# Patient Record
Sex: FEMALE | Race: WHITE | NOT HISPANIC OR LATINO | Employment: FULL TIME | URBAN - METROPOLITAN AREA
[De-identification: names, ages, dates, MRNs, and addresses within clinical notes are randomized per-mention and may not be internally consistent; named-entity substitution may affect disease eponyms.]

---

## 2024-11-15 ENCOUNTER — OFFICE VISIT (OUTPATIENT)
Dept: INTERNAL MEDICINE | Facility: CLINIC | Age: 34
End: 2024-11-15
Payer: COMMERCIAL

## 2024-11-15 ENCOUNTER — LAB (OUTPATIENT)
Dept: LAB | Facility: HOSPITAL | Age: 34
End: 2024-11-15
Payer: COMMERCIAL

## 2024-11-15 VITALS
OXYGEN SATURATION: 97 % | DIASTOLIC BLOOD PRESSURE: 80 MMHG | WEIGHT: 218.4 LBS | HEART RATE: 81 BPM | SYSTOLIC BLOOD PRESSURE: 132 MMHG | HEIGHT: 67 IN | BODY MASS INDEX: 34.28 KG/M2

## 2024-11-15 DIAGNOSIS — K58.0 IRRITABLE BOWEL SYNDROME WITH DIARRHEA: ICD-10-CM

## 2024-11-15 DIAGNOSIS — Z13.0 SCREENING FOR ENDOCRINE, NUTRITIONAL, METABOLIC AND IMMUNITY DISORDER: ICD-10-CM

## 2024-11-15 DIAGNOSIS — Z01.419 WELL WOMAN EXAM WITH ROUTINE GYNECOLOGICAL EXAM: ICD-10-CM

## 2024-11-15 DIAGNOSIS — Z76.89 ENCOUNTER TO ESTABLISH CARE WITH NEW DOCTOR: ICD-10-CM

## 2024-11-15 DIAGNOSIS — Z13.29 SCREENING FOR ENDOCRINE, NUTRITIONAL, METABOLIC AND IMMUNITY DISORDER: ICD-10-CM

## 2024-11-15 DIAGNOSIS — Z13.220 SCREENING, LIPID: ICD-10-CM

## 2024-11-15 DIAGNOSIS — Z23 NEED FOR INFLUENZA VACCINATION: ICD-10-CM

## 2024-11-15 DIAGNOSIS — Z13.21 SCREENING FOR ENDOCRINE, NUTRITIONAL, METABOLIC AND IMMUNITY DISORDER: ICD-10-CM

## 2024-11-15 DIAGNOSIS — Z13.228 SCREENING FOR ENDOCRINE, NUTRITIONAL, METABOLIC AND IMMUNITY DISORDER: ICD-10-CM

## 2024-11-15 DIAGNOSIS — Z23 NEED FOR TDAP VACCINATION: ICD-10-CM

## 2024-11-15 DIAGNOSIS — F41.1 GENERALIZED ANXIETY DISORDER: ICD-10-CM

## 2024-11-15 DIAGNOSIS — Z00.00 WELLNESS EXAMINATION: Primary | ICD-10-CM

## 2024-11-15 PROBLEM — E61.1 IRON DEFICIENCY: Status: ACTIVE | Noted: 2024-11-15

## 2024-11-15 LAB
ALBUMIN SERPL-MCNC: 4.2 G/DL (ref 3.5–5.2)
ALBUMIN/GLOB SERPL: 1.5 G/DL
ALP SERPL-CCNC: 113 U/L (ref 39–117)
ALT SERPL W P-5'-P-CCNC: 18 U/L (ref 1–33)
ANION GAP SERPL CALCULATED.3IONS-SCNC: 8 MMOL/L (ref 5–15)
AST SERPL-CCNC: 16 U/L (ref 1–32)
BILIRUB SERPL-MCNC: 0.2 MG/DL (ref 0–1.2)
BUN SERPL-MCNC: 8 MG/DL (ref 6–20)
BUN/CREAT SERPL: 11.6 (ref 7–25)
CALCIUM SPEC-SCNC: 11 MG/DL (ref 8.6–10.5)
CHLORIDE SERPL-SCNC: 106 MMOL/L (ref 98–107)
CHOLEST SERPL-MCNC: 124 MG/DL (ref 0–200)
CO2 SERPL-SCNC: 24 MMOL/L (ref 22–29)
CREAT SERPL-MCNC: 0.69 MG/DL (ref 0.57–1)
DEPRECATED RDW RBC AUTO: 40.8 FL (ref 37–54)
EGFRCR SERPLBLD CKD-EPI 2021: 115.5 ML/MIN/1.73
ERYTHROCYTE [DISTWIDTH] IN BLOOD BY AUTOMATED COUNT: 14.6 % (ref 12.3–15.4)
GLOBULIN UR ELPH-MCNC: 2.8 GM/DL
GLUCOSE SERPL-MCNC: 91 MG/DL (ref 65–99)
HBA1C MFR BLD: 5 % (ref 4.8–5.6)
HCT VFR BLD AUTO: 31.2 % (ref 34–46.6)
HDLC SERPL-MCNC: 32 MG/DL (ref 40–60)
HGB BLD-MCNC: 9.9 G/DL (ref 12–15.9)
LDLC SERPL CALC-MCNC: 70 MG/DL (ref 0–100)
LDLC/HDLC SERPL: 2.1 {RATIO}
MCH RBC QN AUTO: 24.5 PG (ref 26.6–33)
MCHC RBC AUTO-ENTMCNC: 31.7 G/DL (ref 31.5–35.7)
MCV RBC AUTO: 77.2 FL (ref 79–97)
PLATELET # BLD AUTO: 240 10*3/MM3 (ref 140–450)
PMV BLD AUTO: 12.6 FL (ref 6–12)
POTASSIUM SERPL-SCNC: 4.2 MMOL/L (ref 3.5–5.2)
PROT SERPL-MCNC: 7 G/DL (ref 6–8.5)
RBC # BLD AUTO: 4.04 10*6/MM3 (ref 3.77–5.28)
SODIUM SERPL-SCNC: 138 MMOL/L (ref 136–145)
TRIGL SERPL-MCNC: 124 MG/DL (ref 0–150)
TSH SERPL DL<=0.05 MIU/L-ACNC: 0.86 UIU/ML (ref 0.27–4.2)
VLDLC SERPL-MCNC: 22 MG/DL (ref 5–40)
WBC NRBC COR # BLD AUTO: 7.83 10*3/MM3 (ref 3.4–10.8)

## 2024-11-15 PROCEDURE — 83036 HEMOGLOBIN GLYCOSYLATED A1C: CPT

## 2024-11-15 PROCEDURE — 80050 GENERAL HEALTH PANEL: CPT

## 2024-11-15 PROCEDURE — 80061 LIPID PANEL: CPT

## 2024-11-15 NOTE — PROGRESS NOTES
Office Note     Name: Nereida Mejia    : 1990     MRN: [id number]     Chief Complaint  Establish Care (Hasn't been to PCP in years ), Anxiety, and Fatigue    Subjective     History of Present Illness:  Nereida Mejia is a 34 y.o. female who presents today for establish care, anxiety and fatigue   Is currently fasting     Some concerns for stomach issues- diarrhea on and off- will suddenly have urgency for bowel movements happens approx 2-3 times per month   No correlation with foods   Has tried elimination diet- no conclusion   IBS diarrhea type     Anxiety and generalized fatigue    PMH-  IBS diarrhea   Low iron?     Meds-  None     Family Hx-   Maternal GM- - heart failure, cervical cancer   Maternal GF- - alcohol   Paternal GM- alive   Paternal GF- - unsure   Mother- alive- arthritis, depression, HPL, bipolar?   Father- alive- alcohol, drug, DM, HPL, Kidney disease   Brother- alive- anxiety, bipolar, ADHD   Brother- -   Brother- alive- Healthy     Alcohol not currently- quit   Smoking - quit  1 pack a day x 15 years   Drug use none   Job - [employer]   Stress - 6/10   Sun Exposure - will try to protect skin       Dental/Eye exams - up to date. Vision is stable- needs glasses- computer   Diet/Exercise- Diet- Healthy- moderate - homecooked. - does like soda- tries to limit   Exercise- walking daily approx 20min. Was more active previously    OBGYN -will place referral   PAP will schedule  Mammo - not yet - will start at 38  DEXA - not yet   BC/Hormone replacement - none   Vitamin D - none      Colonoscopy/Cologuard - will start at 43.     Immunizations  Tdap-today   Flu- today   COVID- completed no boosters   PNA- not yet       Review of Systems:   Review of Systems   Constitutional:  Negative for chills and fever.   HENT:  Negative for dental problem, trouble swallowing and voice change.    Eyes:  Negative for visual disturbance.   Respiratory:   Negative for cough, chest tightness, shortness of breath and wheezing.    Cardiovascular:  Negative for chest pain, palpitations and leg swelling.   Gastrointestinal:  Positive for diarrhea. Negative for abdominal pain, blood in stool, constipation, nausea and vomiting.        IBS   Genitourinary:  Negative for dysuria.   Musculoskeletal:  Negative for gait problem.   Skin:  Negative for rash.   Neurological:  Negative for light-headedness and headache.   Psychiatric/Behavioral:  Positive for dysphoric mood, depressed mood and stress. Negative for self-injury, sleep disturbance and suicidal ideas. The patient is nervous/anxious.        Past Medical History:   Past Medical History:   Diagnosis Date    Asthma Childhood    No issues now       Past Surgical History:   Past Surgical History:   Procedure Laterality Date     SECTION  2013, 3.18.2009, 2007    I had 3 the dates are 2007, 2009, 2013       Immunizations:   Immunization History   Administered Date(s) Administered    COVID-19 (MODERNA) 1st,2nd,3rd Dose Monovalent 2021, 2021        Medications:   No current outpatient medications on file.    Allergies:   No Known Allergies    Family History:   Family History   Problem Relation Age of Onset    Arthritis Mother     Depression Mother     Hyperlipidemia Mother     Mental illness Mother     Alcohol abuse Father     Diabetes Father     Drug abuse Father     Hyperlipidemia Father     Kidney disease Father     Anxiety disorder Brother     Mental illness Brother     Bipolar disorder Brother     Heart failure Maternal Grandmother     Cervical cancer Maternal Grandmother     Stroke Maternal Grandfather     Alcohol abuse Maternal Grandfather     COPD Paternal Aunt     Learning disabilities Paternal Aunt     Mental illness Paternal Aunt     Learning disabilities Paternal Uncle     Mental illness Paternal Uncle        Social History:   Social History     Socioeconomic History     "Marital status:    Tobacco Use    Smoking status: Former     Current packs/day: 0.00     Average packs/day: 1.5 packs/day for 10.0 years (15.0 ttl pk-yrs)     Types: Cigarettes     Quit date:      Years since quittin.8    Smokeless tobacco: Never    Tobacco comments:     Off and on for 10 years dont remember dates   Vaping Use    Vaping status: Never Used   Substance and Sexual Activity    Alcohol use: Not Currently     Comment: quit     Drug use: Never    Sexual activity: Yes     Partners: Male     Birth control/protection: Tubal ligation         Objective     Vital Signs  /80   Pulse 81   Ht 170.2 cm (67\")   Wt 99.1 kg (218 lb 6.4 oz)   SpO2 97%   BMI 34.21 kg/m²   Estimated body mass index is 34.21 kg/m² as calculated from the following:    Height as of this encounter: 170.2 cm (67\").    Weight as of this encounter: 99.1 kg (218 lb 6.4 oz).    BMI is >= 30 and <35. (Class 1 Obesity). The following options were offered after discussion;: exercise counseling/recommendations and nutrition counseling/recommendations      Physical Exam  Vitals and nursing note reviewed.   Constitutional:       General: She is not in acute distress.     Appearance: Normal appearance.   HENT:      Head: Normocephalic and atraumatic.      Right Ear: Tympanic membrane normal.      Left Ear: Tympanic membrane normal.      Nose: Nose normal.      Mouth/Throat:      Mouth: Mucous membranes are moist.   Eyes:      Extraocular Movements: Extraocular movements intact.      Conjunctiva/sclera: Conjunctivae normal.      Pupils: Pupils are equal, round, and reactive to light.   Cardiovascular:      Rate and Rhythm: Normal rate and regular rhythm.      Heart sounds: Normal heart sounds.   Pulmonary:      Effort: Pulmonary effort is normal. No respiratory distress.      Breath sounds: Normal breath sounds.   Abdominal:      General: Bowel sounds are normal.      Palpations: Abdomen is soft.   Musculoskeletal:         " General: Normal range of motion.      Cervical back: Normal range of motion.      Comments: Moving all extremities    Skin:     General: Skin is warm and dry.   Neurological:      General: No focal deficit present.      Mental Status: She is alert and oriented to person, place, and time.   Psychiatric:         Mood and Affect: Mood normal.         Behavior: Behavior normal.         Thought Content: Thought content normal.         Judgment: Judgment normal.          Procedures     Assessment and Plan   Diagnosis Discussed   Continue to monitor   Plenty of fluids, monitor diet and exercise   Labs ordered will notify of results   Immunizations up to date - Flu and Tdap today   Follow up with GYN- establish care- PAP   Follow up with Gastroenterology   Follow up with Behavioral Health   Take medications as instructed  Follow up as directed   If symptoms worsen or persist please seek further evaluation     1. Wellness examination    2. Encounter to establish care with new doctor  Reviewed PMH, medications and specialists     3. Screening for endocrine, nutritional, metabolic and immunity disorder  - CBC (No Diff); Future  - Comprehensive Metabolic Panel; Future  - Hemoglobin A1c; Future  - TSH Rfx On Abnormal To Free T4; Future    4. Screening, lipid  - Lipid Panel; Future    5. Well woman exam with routine gynecological exam  - Ambulatory Referral to Gynecology  Establish care   Need for PAP- hx of HPV per patient     6. Irritable bowel syndrome with diarrhea  - Ambulatory Referral to Gastroenterology    7. Generalized anxiety disorder  - Ambulatory Referral to Behavioral Health       Follow Up  Return in about 1 year (around 11/15/2025), or if symptoms worsen or fail to improve, for Annual, fasting labs.    Terri Burgos MD  [facility] INTERNAL MEDICINE  [address] [place] [id number] [phone]

## 2024-11-15 NOTE — PATIENT INSTRUCTIONS
Diagnosis Discussed   Continue to monitor   Plenty of fluids, monitor diet and exercise   Labs ordered will notify of results   Immunizations up to date - Flu and Tdap today   Follow up with GYN- establish care- PAP   Follow up with Gastroenterology   Follow up with Behavioral Health   Take medications as instructed  Follow up as directed   If symptoms worsen or persist please seek further evaluation

## 2024-11-21 ENCOUNTER — PATIENT ROUNDING (BHMG ONLY) (OUTPATIENT)
Dept: INTERNAL MEDICINE | Facility: CLINIC | Age: 34
End: 2024-11-21
Payer: COMMERCIAL

## 2024-11-21 DIAGNOSIS — E61.1 IRON DEFICIENCY: Primary | ICD-10-CM

## 2024-11-21 NOTE — PROGRESS NOTES
A My-chart message has been sent to the patient for Patient Rounding with [facility].

## 2024-11-25 RX ORDER — FERROUS GLUCONATE 324(38)MG
324 TABLET ORAL
Qty: 90 TABLET | Refills: 0 | Status: SHIPPED | OUTPATIENT
Start: 2024-11-25

## 2024-11-27 ENCOUNTER — APPOINTMENT (OUTPATIENT)
Dept: GENERAL RADIOLOGY | Facility: HOSPITAL | Age: 34
End: 2024-11-27
Payer: COMMERCIAL

## 2024-11-27 ENCOUNTER — HOSPITAL ENCOUNTER (EMERGENCY)
Facility: HOSPITAL | Age: 34
Discharge: HOME | End: 2024-11-27
Attending: EMERGENCY MEDICINE | Admitting: EMERGENCY MEDICINE
Payer: COMMERCIAL

## 2024-11-27 VITALS
OXYGEN SATURATION: 100 % | HEART RATE: 76 BPM | DIASTOLIC BLOOD PRESSURE: 75 MMHG | RESPIRATION RATE: 16 BRPM | SYSTOLIC BLOOD PRESSURE: 147 MMHG | BODY MASS INDEX: 34.6 KG/M2 | TEMPERATURE: 98.4 F | WEIGHT: 220.46 LBS | HEIGHT: 67 IN

## 2024-11-27 DIAGNOSIS — M70.12 BURSITIS OF LEFT WRIST: ICD-10-CM

## 2024-11-27 DIAGNOSIS — M25.532 LEFT WRIST PAIN: Primary | ICD-10-CM

## 2024-11-27 PROCEDURE — 99283 EMERGENCY DEPT VISIT LOW MDM: CPT

## 2024-11-27 PROCEDURE — 73110 X-RAY EXAM OF WRIST: CPT

## 2024-11-27 NOTE — ED PROVIDER NOTES
EMERGENCY DEPARTMENT ENCOUNTER    Pt Name: Nereida Mejia  MRN: [id number]  Pt :   1990  Room Number:  RW2/R2  Date of encounter:  2024  PCP: Terri Burgos MD  ED Provider: ISABELLA Santiago    Historian: Patient    HPI:  Chief Complaint: Left wrist pain    Context: Nereida Mejia is a 34 y.o. female who presents to the ED c/o left wrist pain. Patient reports experiencing pain in her left wrist, describing it as having two small knots, with swelling and shooting pain extending up to her arm and into the back of her shoulder. The symptoms began on the  and have been intermittent. Recently, she noticed her hand going numb, with her fingertips turning blue or purple, which was visible through her skin. 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 She has a history of carpal tunnel syndrome in her opposite hand, for which she had surgery, and mentions a ganglion cyst that was supposed to be removed during the surgery but has not caused trouble. She denies any fever and notes that she usually feels hot, but recently has felt normal.    HPI     REVIEW OF SYSTEMS  A chief complaint appropriate review of systems was completed and is negative except as noted in the HPI.     PAST MEDICAL HISTORY  Past Medical History:   Diagnosis Date    Asthma Childhood    No issues now       PAST SURGICAL HISTORY  Past Surgical History:   Procedure Laterality Date     SECTION  2013, 3.18.2009, 2007    I had 3 the dates are 2007, 2009, 2013       FAMILY HISTORY  Family History   Problem Relation Age of Onset    Arthritis Mother     Depression Mother     Hyperlipidemia Mother     Mental illness Mother     Alcohol abuse Father     Diabetes Father     Drug abuse Father     Hyperlipidemia Father     Kidney disease Father     Anxiety disorder Brother     Mental illness Brother     Bipolar disorder Brother     Heart  failure Maternal Grandmother     Cervical cancer Maternal Grandmother     Stroke Maternal Grandfather     Alcohol abuse Maternal Grandfather     COPD Paternal Aunt     Learning disabilities Paternal Aunt     Mental illness Paternal Aunt     Learning disabilities Paternal Uncle     Mental illness Paternal Uncle        SOCIAL HISTORY  Social History     Socioeconomic History    Marital status:    Tobacco Use    Smoking status: Former     Current packs/day: 0.00     Average packs/day: 1.5 packs/day for 10.0 years (15.0 ttl pk-yrs)     Types: Cigarettes     Quit date:      Years since quittin.9    Smokeless tobacco: Never    Tobacco comments:     Off and on for 10 years dont remember dates   Vaping Use    Vaping status: Never Used   Substance and Sexual Activity    Alcohol use: Not Currently     Comment: quit     Drug use: Never    Sexual activity: Yes     Partners: Male     Birth control/protection: Tubal ligation       ALLERGIES  Patient has no known allergies.    PHYSICAL EXAM  Physical Exam  Vitals and nursing note reviewed.   Constitutional:       General: She is not in acute distress.     Appearance: Normal appearance. She is not ill-appearing or toxic-appearing.   HENT:      Head: Normocephalic and atraumatic.      Nose: Nose normal.      Mouth/Throat:      Mouth: Mucous membranes are moist.   Eyes:      Extraocular Movements: Extraocular movements intact.   Cardiovascular:      Rate and Rhythm: Normal rate and regular rhythm.      Pulses: Normal pulses.   Pulmonary:      Effort: Pulmonary effort is normal.   Abdominal:      General: There is no distension.   Musculoskeletal:      Right wrist: Normal.      Left wrist: Swelling, deformity, effusion and tenderness present. No bony tenderness, snuff box tenderness or crepitus. Normal range of motion. Normal pulse.      Cervical back: Normal range of motion and neck supple.   Skin:     General: Skin is warm and dry.      Capillary Refill:  Capillary refill takes less than 2 seconds.   Neurological:      General: No focal deficit present.      Mental Status: She is alert.      Sensory: No sensory deficit.   Psychiatric:         Mood and Affect: Mood normal.         Behavior: Behavior normal.       LAB RESULTS    If labs were ordered, I independently reviewed the results and considered them in treating the patient.    RADIOLOGY  XR Wrist 3+ View Left   Final Result   Impression:   No acute osseous abnormality of the left wrist.            Electronically Signed: Virginie Leal MD     11/27/2024 2:29 PM EST     Workstation ID: [id number]        [x] Radiologist's Report Reviewed:  I ordered and independently interpreted the above noted radiographic studies.  See radiologist's dictation for official interpretation.      PROCEDURES    Procedures    No orders to display       MEDICATIONS GIVEN IN ER    Medications - No data to display    MEDICAL DECISION MAKING, PROGRESS, and CONSULTS   Medical Decision Making  34-year-old nontoxic-appearing female presents ER for evaluation of left wrist pain.  No acute or emergent findings on physical exam.  Neurovascularly intact with strong radial pulses and brisk capillary refill.  Sensation intact.  Imaging of left wrist demonstrates no acute abnormalities.  Patient instructed on symptomatic care and outpatient follow-up with primary care and orthopedic hand.  Patient agreeable to plan of care.  Return precautions provided.    Problems Addressed:  Bursitis of left wrist: complicated acute illness or injury  Left wrist pain: complicated acute illness or injury    Amount and/or Complexity of Data Reviewed  Radiology: ordered and independent interpretation performed. Decision-making details documented in ED Course.      Discussion below represents my analysis of pertinent findings related to patient's condition, differential diagnosis, treatment plan and final disposition.    Differential diagnosis: The symptoms described,  including intermittent wrist pain, numbness, and discoloration of the fingertips, suggest possible nerve involvement, potentially related to her history of carpal tunnel syndrome. The presence of a ganglion cyst might also contribute to these symptoms. Differential diagnoses could include recurrent carpal tunnel syndrome, Raynaud's phenomenon or another form of peripheral neuropathy. Additional differential diagnosis include but are not limited to Contusion, arthritis, fracture, dislocation, tendon/ligamentous injury.       Additional sources  Discussed/ obtained information from independent historians:   [] Spouse  [] Parent  [] Family member  [] Friend  [] EMS   [] Other:  External (non-ED) record review:   [] Inpatient record:   [] Office record:   [] Outpatient record:   [] Prior Outpatient labs:   [] Prior Outpatient radiology:   [] Primary Care record:   [] Outside ED record:   [] Other:   Patient's care impacted by:   [] Diabetes  [] Hypertension  [] Hyperlipidemia  [] Hypothyroidism   [] Coronary Artery Disease   [] COPD   [] Cancer   [] Obesity  [] GERD   [] Tobacco Abuse   [] Substance Abuse    [x] Anxiety   [] Depression   [x] Other: Iron deficiency, irritable bowel syndrome with diarrhea  Care significantly affected by Social Determinants of Health (housing and economic circumstances, unemployment)    [] Yes     [x] No   If yes, Patient's care significantly limited by  Social Determinants of Health including:   [] Inadequate housing   [] Low income   [] Alcoholism and drug addiction in family   [] Problems related to primary support group   [] Unemployment   [] Problems related to employment   [] Other Social Determinants of Health:   Orders placed during this visit:  Orders Placed This Encounter   Procedures    XR Wrist 3+ View Left   I considered prescription management  with:   [x] Pain medication: The use of prescription pain medication was considered in this patient however not implemented as risks  outweigh benefits of prescription pain management and it was felt patient's symptoms could be managed with OTC anti-inflammatory medications and Tylenol.   [] Antiviral  [] Antibiotic   [] Other:   Rationale:  ED Course:    ED Course as of 11/27/24 1720   Wed Nov 27, 2024   1446 Vitals and Telemetry tracing was reviewed and directly interpreted by myself demonstrating blood pressure 147/75, afebrile, heart rate 76, respirations 16 breaths/min, oxygen saturation 100% on room air [JG]   1447 BP: 147/75 [JG]   1447 Temp: 98.4 °F (36.9 °C) [JG]   1447 Heart Rate: 76 [JG]   1447 Resp: 16 [JG]   1447 SpO2: 100 % [JG]   1447 X-ray left wrist personally interpreted by myself and with official read provided by radiology demonstrates no acute osseous abnormality of the left wrist. [JG]      ED Course User Index  [JG] Johan Casey, PA          DIAGNOSIS  Final diagnoses:   Left wrist pain   Bursitis of left wrist     DISPOSITION    ED Disposition       ED Disposition   Discharge    Condition   Stable    Comment   --           DISCHARGE    Patient discharged in stable condition.    Reviewed implications of results, diagnosis, meds, responsibility to follow up, warning signs and symptoms of possible worsening, potential complications and reasons to return to ER.    Patient/Family voiced understanding of above instructions.    Discussed plan for discharge, as there is no emergent indication for admission.  Pt/family is agreeable and understands need for follow up and possible repeat testing.  Pt/family is aware that discharge does not mean that nothing is wrong but that it indicates no emergency is currently present that requires admission and they must continue care with follow-up as given below or with a physician of their choice.     FOLLOW-UP  Paula Bailey MD  [address] [address] [phone]    Call   Call for follow up with orthopedic surgery    [facility] EMERGENCY  DEPARTMENT  [address] [facility] [address]  [phone]  Go to   If symptoms worsen         Medication List      No changes were made to your prescriptions during this visit.          Please note that portions of this document were completed with voice recognition software.        Johan Casey PA  11/27/24 [id number]

## 2024-11-27 NOTE — DISCHARGE INSTRUCTIONS
Symptomatic care is recommended. Take all medications as prescribed and instructed. Follow up with orthopedic hand and primary care as directed or return to Emergency Department with worsening of symptoms.

## 2024-12-17 ENCOUNTER — TELEPHONE (OUTPATIENT)
Dept: ORTHOPEDIC SURGERY | Facility: CLINIC | Age: 34
End: 2024-12-17
Payer: COMMERCIAL

## 2024-12-17 NOTE — TELEPHONE ENCOUNTER
"Relay     \"LVM - PLEASE SCHEDULE NEW PT APPOINTMENT WITH DR ABURTO OR DR MUÑOZ TO ESTABLISH CARE\"                "

## 2024-12-23 ENCOUNTER — OFFICE VISIT (OUTPATIENT)
Dept: ORTHOPEDIC SURGERY | Facility: CLINIC | Age: 34
End: 2024-12-23
Payer: COMMERCIAL

## 2024-12-23 VITALS
BODY MASS INDEX: 34.53 KG/M2 | DIASTOLIC BLOOD PRESSURE: 78 MMHG | WEIGHT: 220 LBS | HEIGHT: 67 IN | SYSTOLIC BLOOD PRESSURE: 140 MMHG

## 2024-12-23 DIAGNOSIS — G56.22 CUBITAL TUNNEL SYNDROME ON LEFT: ICD-10-CM

## 2024-12-23 DIAGNOSIS — G56.02 CARPAL TUNNEL SYNDROME OF LEFT WRIST: Primary | ICD-10-CM

## 2024-12-23 NOTE — PROGRESS NOTES
[facility] Orthopedic     Office Visit       Date: 12/23/2024   Patient Name: Nereida Mejia  MRN: [id number]  YOB: 1990    Referring Physician: Hilario Armstrong MD     Chief Complaint:   Chief Complaint   Patient presents with   • Left Hand - Pain, Initial Evaluation   • Left Wrist - Pain, Initial Evaluation     History of Present Illness:   Nereida Mejia is a 34 y.o. female right-hand-dominant presented clinic for new patient with complaints of left hand and wrist pain, swelling, numbness and tingling.  Symptoms have been present since November 19.  She reports that she was typing a lot at work when she developed pain, swelling, and a blue hue to her fingers.  This improved when she stopped typing.  Symptoms have been aggravated with repetitive use of the digits.  She endorses numbness and tingling that is worse in the small and ring fingers but is present throughout the digits.  She has had a prior right carpal tunnel release in 2014 with no recurrence of her symptoms.  She has no family history of rheumatoid arthritis or lupus.  No other complaints or concerns.    Subjective   Review of Systems:   Review of Systems   HENT: Negative.     Eyes: Negative.    Respiratory: Negative.     Cardiovascular: Negative.    Gastrointestinal: Negative.    Endocrine: Negative.    Genitourinary: Negative.    Musculoskeletal:  Positive for arthralgias.   Allergic/Immunologic: Negative.    Hematological: Negative.    Psychiatric/Behavioral: Negative.        Pertinent review of systems per HPI    I reviewed the patient's chief complaint, history of present illness, review of systems, past medical history, surgical history, family history, social history, medications and allergy list in the EMR on 12/23/2024 and agree with the findings above.    Objective    Vital Signs:   Vitals:    12/23/24 1353   BP: 140/78   Weight: 99.8 kg (220 lb)   Height:  "170.2 cm (67\")     General: No acute distress. Alert and oriented.   Cardiovascular: Palpable radial pulse.   Respiratory: Breathing is nonlabored.   Ortho Exam:  Examination left upper extremity demonstrates no deformity.  No skin lesions or abrasions.  No atrophy or wasting.  Positive Tinel, Durkan's, Phalen's at the wrist.  Negative Tinel's over Guyon's canal.  Positive Tinel's at the elbow with positive flexion compression test.  Nontender over the pronator.  There is a small volar ganglion cyst measuring less than 1 x 1 cm.  This is nontender to palpation.  Palpable radial and ulnar pulses.  Juan's test demonstrates appropriate flow through both the radial and ulnar arteries.  No skin ulcerations.  Sensation is diminished to light touch throughout the median and ulnar nerve distributions.  Imaging / Studies:    Imaging Results (Last 24 Hours)       ** No results found for the last 24 hours. **        Left wrist x-rays obtained on 11/27/2024 were personally reviewed and interpreted by myself. No evidence of acute fracture or dislocation.  Normal alignment.  Normal soft tissue window.    Assessment / Plan    Assessment/Plan:   Nereida Mejia is a 34 y.o. female with lateral carpal and cubital tunnel syndrome.    I discussed with the patient their clinical findings demonstrate carpal tunnel and cubital tunnel syndrome.  The pathophysiology of the condition, including compression of the median nerve in the carpal tunnel and ulnar nerve at the cubital tunnel, was explained in detail.  It was also discussed that with more severe symptomatology, such as persistent and worsening paresthesias, that permanent nerve damage may result, which may make improvement after surgery less predictable.  Both conservative and surgical options were discussed.  Conservative treatments in the form of: observation, gentle nerve gliding exercises, night time splinting, and injection were presented.  Operative treatment in the form of " open carpal tunnel release and cubital tunnel release was presented and reiterated that the goal of surgery is to prevent further compression and damage to the nerve.  Further workup with electrodiagnostic studies was also discussed. After expressing understanding of all options, the patient elects to proceed with nighttime wrist and elbow splinting, nerve glides, and obtaining electrodiagnostic studies.  The patient has been taken off work by the prior treating physician.  I discussed that from my standpoint, she may return to work without restrictions.  Work restrictions will not be provided unless the patient is treated surgically.  She expressed understanding.  They were agreeable with the plan.  All questions and concerns were addressed.      ICD-10-CM ICD-9-CM   1. Carpal tunnel syndrome of left wrist  G56.02 354.0   2. Cubital tunnel syndrome on left  G56.22 354.2     Follow Up:   Return for Follow Up- After Testing.      Paula Bailey MD  [facility]

## 2025-01-15 ENCOUNTER — LAB (OUTPATIENT)
Dept: LAB | Facility: HOSPITAL | Age: 35
End: 2025-01-15
Payer: COMMERCIAL

## 2025-01-15 ENCOUNTER — OFFICE VISIT (OUTPATIENT)
Dept: GASTROENTEROLOGY | Facility: CLINIC | Age: 35
End: 2025-01-15
Payer: COMMERCIAL

## 2025-01-15 VITALS
TEMPERATURE: 97.3 F | BODY MASS INDEX: 34.78 KG/M2 | DIASTOLIC BLOOD PRESSURE: 82 MMHG | WEIGHT: 221.6 LBS | SYSTOLIC BLOOD PRESSURE: 129 MMHG | HEIGHT: 67 IN | HEART RATE: 73 BPM

## 2025-01-15 DIAGNOSIS — R19.7 DIARRHEA, UNSPECIFIED TYPE: Primary | ICD-10-CM

## 2025-01-15 DIAGNOSIS — R19.7 DIARRHEA, UNSPECIFIED TYPE: ICD-10-CM

## 2025-01-15 LAB

## 2025-01-15 PROCEDURE — 82784 ASSAY IGA/IGD/IGG/IGM EACH: CPT

## 2025-01-15 PROCEDURE — 36415 COLL VENOUS BLD VENIPUNCTURE: CPT

## 2025-01-15 PROCEDURE — 86231 EMA EACH IG CLASS: CPT

## 2025-01-15 PROCEDURE — 86258 DGP ANTIBODY EACH IG CLASS: CPT

## 2025-01-15 PROCEDURE — 87177 OVA AND PARASITES SMEARS: CPT

## 2025-01-15 PROCEDURE — 83993 ASSAY FOR CALPROTECTIN FECAL: CPT

## 2025-01-15 PROCEDURE — 87507 IADNA-DNA/RNA PROBE TQ 12-25: CPT

## 2025-01-15 PROCEDURE — 99204 OFFICE O/P NEW MOD 45 MIN: CPT

## 2025-01-15 PROCEDURE — 86364 TISS TRNSGLTMNASE EA IG CLAS: CPT

## 2025-01-15 PROCEDURE — 82653 EL-1 FECAL QUANTITATIVE: CPT

## 2025-01-15 PROCEDURE — 87209 SMEAR COMPLEX STAIN: CPT

## 2025-01-15 RX ORDER — DICYCLOMINE HYDROCHLORIDE 10 MG/1
CAPSULE ORAL
Qty: 90 CAPSULE | Refills: 1 | Status: SHIPPED | OUTPATIENT
Start: 2025-01-15

## 2025-01-15 NOTE — PROGRESS NOTES
Office Note     Name: Nereida Mejia    : 1990     MRN: [id number]     Chief Complaint  Diarrhea    Subjective     History of Present Illness:  History of Present Illness  The patient is a 34-year-old female who presents today for evaluation of diarrhea for the past 5 years.    She reports experiencing severe abdominal cramps followed by episodes of explosive diarrhea. She has attempted to identify potential dietary triggers through an elimination diet over the past 3 months, but this has not yielded any significant changes.     She has sought emergency care on several occasions due to these symptoms, with the most recent visit occurring in 2024. During this visit, she was diagnosed with a stomach bug and was prescribed antiemetic medication and intravenous fluids. She reports no current bleeding associated with her diarrhea, although she did observe streaks of blood and mucus during her ER visit. She also reports frequent mucus production towards the end of her bowel movements and has developed hemorrhoids.     She occasionally experiences vomiting and severe cramping, which occurred most recently 2 days ago. She typically has 3 to 4 bowel movements on a good day. She has not experienced constipation or urgency after meals. She has previously experienced dark green stools but reports no tarry stools. She reports no current abdominal pain. She has a history of severe heartburn, for which she took Zantac daily, but currently reports no heartburn for quite a while now.    She has a lifelong history of anemia, the cause of which is unknown. She does take an iron supplement.     Supplemental Information  She has retained her gallbladder. Her surgical history includes a  and tubal ligation.    SOCIAL HISTORY  She quit smoking. She does not drink alcohol.    FAMILY HISTORY  Her mother has stomach ulcers and had her gallbladder removed. Her grandmother had cervical cancer, skin cancer, and a weak  heart, which she eventually  from. She does not have any family history of colon cancer that she is aware of. Her mother and daughter are anemic.    MEDICATIONS  Past: Zantac    Past Medical History:   Past Medical History:   Diagnosis Date    Anemia     Asthma Childhood    No issues now    CTS (carpal tunnel syndrome) 2014    Right hand surgery       Past Surgical History:   Past Surgical History:   Procedure Laterality Date     SECTION  2013, 3.18.2009, 2007    I had 3 the dates are 2007, 2009, 2013    HAND SURGERY  2014    Right hand carpal tunnel    TUBAL ABDOMINAL LIGATION         Immunizations:   Immunization History   Administered Date(s) Administered    COVID-19 (MODERNA) 1st,2nd,3rd Dose Monovalent 2021, 2021    Fluzone  >6mos 11/15/2024    Tdap 11/15/2024        Medications:     Current Outpatient Medications:     ferrous gluconate (FERGON) 324 MG tablet, Take 1 tablet by mouth Daily With Breakfast., Disp: 90 tablet, Rfl: 0    Allergies:   No Known Allergies    Family History:   Family History   Problem Relation Age of Onset    Arthritis Mother     Depression Mother     Hyperlipidemia Mother     Mental illness Mother     Alcohol abuse Father     Diabetes Father     Drug abuse Father     Hyperlipidemia Father     Kidney disease Father     Anxiety disorder Brother     Mental illness Brother     Bipolar disorder Brother     COPD Paternal Aunt     Learning disabilities Paternal Aunt     Mental illness Paternal Aunt     Learning disabilities Paternal Uncle     Mental illness Paternal Uncle     Heart failure Maternal Grandmother     Cervical cancer Maternal Grandmother     Stroke Maternal Grandfather     Alcohol abuse Maternal Grandfather     Colon cancer Neg Hx        Social History:   Social History     Socioeconomic History    Marital status:    Tobacco Use    Smoking status: Former     Current packs/day: 0.00     Average packs/day: 1.5 packs/day for  "10.0 years (15.0 ttl pk-yrs)     Types: Cigarettes     Quit date: 2010     Years since quitting: 15.0     Passive exposure: Past    Smokeless tobacco: Never    Tobacco comments:     Off and on for 10 years dont remember dates   Vaping Use    Vaping status: Never Used   Substance and Sexual Activity    Alcohol use: Never     Comment: quit 2007    Drug use: Never    Sexual activity: Yes     Partners: Male     Birth control/protection: Tubal ligation         Objective     Vital Signs  /82 (BP Location: Left arm, Patient Position: Sitting, Cuff Size: Adult)   Pulse 73   Temp 97.3 °F (36.3 °C) (Temporal)   Ht 170.2 cm (67\")   Wt 101 kg (221 lb 9.6 oz)   BMI 34.71 kg/m²   Estimated body mass index is 34.71 kg/m² as calculated from the following:    Height as of this encounter: 170.2 cm (67\").    Weight as of this encounter: 101 kg (221 lb 9.6 oz).            Physical Exam  Vitals reviewed.   Constitutional:       General: She is awake.   Cardiovascular:      Rate and Rhythm: Normal rate.   Pulmonary:      Effort: Pulmonary effort is normal.   Abdominal:      Palpations: Abdomen is soft.      Tenderness: There is no abdominal tenderness.   Skin:     General: Skin is warm and dry.   Neurological:      Mental Status: She is alert.        Physical Exam      Results        Assessment and Plan     Assessment & Plan  Diarrhea, unspecified type    Orders:    Calprotectin, Fecal - Stool, Per Rectum; Future    Pancreatic Elastase, Fecal - Stool, Per Rectum; Future    Ova & Parasite Examination - Stool, Per Rectum    Celiac Comprehensive Panel    dicyclomine (BENTYL) 10 MG capsule; Take one by mouth 3 times daily 30 minutes before meals    Gastrointestinal Panel, PCR - Stool, Per Rectum; Future       Assessment & Plan  1. Chronic diarrhea.  The chronic nature of her symptoms, persisting for 5 years, suggests a non-bacterial etiology. Given her anemia and history of rectal bleeding, a colonoscopy is warranted. A fecal " calprotectin test will be conducted to assess for potential inflammatory bowel diseases such as Crohn's disease or ulcerative colitis. A celiac panel will also be ordered. Dicyclomine 10 mg has been prescribed to manage her cramping, to be taken as needed up to a few times daily. FiberCon has been recommended to add bulk to her stool and potentially alleviate her diarrhea. A colonoscopy will be scheduled, and she has been informed about the procedure and the need for a clear liquid diet the day prior. She has been advised to avoid driving post-procedure due to the sedative effects of the medications used during the procedure.    2. Anemia.  She has a history of anemia since childhood, with no known cause. The anemia has worsened since May. A colonoscopy will be scheduled to investigate potential gastrointestinal causes. She has been advised to monitor for any signs of black or tarry stools and to report any such findings immediately.       Follow Up  After colonoscopy    Patient or patient representative verbalized consent for the use of Ambient Listening during the visit with  JESS De La O for chart documentation. 1/15/2025  08:43 JESS White  [facility]  [facility] GASTROENTEROLOGY  [address] [place] [id number]

## 2025-01-15 NOTE — PATIENT INSTRUCTIONS
Fibercon is a great fiber supplement that doesn't cause bloating. Each dose is only 2-3 grams of fiber.

## 2025-01-16 LAB
ENDOMYSIUM IGA SER QL: NEGATIVE
GLIADIN PEPTIDE IGA SER-ACNC: 3 UNITS (ref 0–19)
GLIADIN PEPTIDE IGG SER-ACNC: 3 UNITS (ref 0–19)
IGA SERPL-MCNC: 183 MG/DL (ref 87–352)
TTG IGA SER-ACNC: <2 U/ML (ref 0–3)
TTG IGG SER-ACNC: 3 U/ML (ref 0–5)

## 2025-01-17 LAB
O+P SPEC MICRO: NORMAL
O+P STL TRI STN: NORMAL

## 2025-01-17 RX ORDER — SODIUM, POTASSIUM,MAG SULFATES 17.5-3.13G
SOLUTION, RECONSTITUTED, ORAL ORAL
Qty: 354 ML | Refills: 0 | Status: SHIPPED | OUTPATIENT
Start: 2025-01-17 | End: 2025-01-17 | Stop reason: SDUPTHER

## 2025-01-19 LAB — CALPROTECTIN STL-MCNT: 16 UG/G (ref 0–120)

## 2025-01-20 LAB — ELASTASE PANC STL-MCNT: >800 UG ELAST./G

## 2025-01-22 RX ORDER — SODIUM, POTASSIUM,MAG SULFATES 17.5-3.13G
SOLUTION, RECONSTITUTED, ORAL ORAL
Qty: 354 ML | Refills: 0 | Status: SHIPPED | OUTPATIENT
Start: 2025-01-22

## 2025-01-23 ENCOUNTER — OUTSIDE FACILITY SERVICE (OUTPATIENT)
Dept: GASTROENTEROLOGY | Facility: CLINIC | Age: 35
End: 2025-01-23
Payer: COMMERCIAL

## 2025-01-23 PROCEDURE — 45385 COLONOSCOPY W/LESION REMOVAL: CPT | Performed by: INTERNAL MEDICINE

## 2025-01-23 PROCEDURE — 88305 TISSUE EXAM BY PATHOLOGIST: CPT | Performed by: INTERNAL MEDICINE

## 2025-01-23 PROCEDURE — 45380 COLONOSCOPY AND BIOPSY: CPT | Performed by: INTERNAL MEDICINE

## 2025-01-24 ENCOUNTER — LAB REQUISITION (OUTPATIENT)
Dept: LAB | Facility: HOSPITAL | Age: 35
End: 2025-01-24
Payer: COMMERCIAL

## 2025-01-24 DIAGNOSIS — D12.3 BENIGN NEOPLASM OF TRANSVERSE COLON: ICD-10-CM

## 2025-01-24 DIAGNOSIS — R19.7 DIARRHEA, UNSPECIFIED: ICD-10-CM

## 2025-01-27 LAB
CYTO UR: NORMAL
LAB AP CASE REPORT: NORMAL
LAB AP CLINICAL INFORMATION: NORMAL
PATH REPORT.FINAL DX SPEC: NORMAL
PATH REPORT.GROSS SPEC: NORMAL

## 2025-02-18 ENCOUNTER — PROCEDURE VISIT (OUTPATIENT)
Dept: NEUROLOGY | Facility: CLINIC | Age: 35
End: 2025-02-18
Payer: COMMERCIAL

## 2025-02-18 DIAGNOSIS — G56.22 CUBITAL TUNNEL SYNDROME ON LEFT: ICD-10-CM

## 2025-02-18 DIAGNOSIS — G56.02 CARPAL TUNNEL SYNDROME OF LEFT WRIST: ICD-10-CM

## 2025-02-18 NOTE — PROGRESS NOTES
[facility] Neurology [place]   Electrodiagnostic Laboratory    Nerve Conduction & EMG Report        Patient:   Nereida Mejia   Patient ID: [id number]   YOB: 1990  Sex: female      Exam Physician:  Cabrera Lozada MD  Refer Physician:  Paula Bailey MD    Electromyogram and Nerve Conduction Velocity Procedure Note    Hx: 34 y.o. right handed female with complaint of numbness involving the the upper extremities. Symptoms have been present for several months and were provoked by activity. Significant past medical history includes carpal tunnel syndrome.  Family history no family history of nerve or muscle disease.    Exam: Motor power is normal. There is no atrophy. There are no fasciculations. Deep tendon reflexes are present and symmetrical. Sensory exam is normal.      Edx studies of the B UE were performed to evaluate for peripheral nerve entrapment.     NCS Examination   For sensory nerve conduction studies, the amplitude is measured peak-to-peak, the latency reported is the distal peak latency, and the conduction velocity, if measured, is determined from onset latencies and is over the forearm.   For motor nerve conduction studies, the amplitude is measured baseline-to-peak, the latency reported is the distal onset latency, the conduction velocity is calculated over the forearm, and the F wave latency is the minimum latency.   Unless otherwise noted, the hand temperature was monitored continuously and remained between 32°C and 36°C during the performance of the NCSs.          Nerve Conduction Studies  Anti Sensory Summary Table     Stim Site NR Norm Peak (ms) O-P Amp (µV) Norm O-P Amp Onset (ms) Site1 Site2 Delta-0 (ms) Dist (cm) Arturo (m/s) Norm Arturo (m/s)   Left Median Anti Sensory (2nd Digit)   Wrist    <3.6 14.6 >10 3.4 Wrist 2nd Digit 3.4 14.0 41    Right Median Anti Sensory (2nd Digit)   Wrist    <3.6 25.4 >10 3.2 Wrist 2nd Digit 3.2 14.0 44    Left Radial Anti Sensory (Base 1st Digit)    Wrist    <3.1 20.1  0.9 Wrist Base 1st Digit 0.9 0.0     Right Radial Anti Sensory (Base 1st Digit)   Wrist    <3.1 12.9  1.4 Wrist Base 1st Digit 1.4 0.0     Left Ulnar Anti Sensory (5th Digit)   Wrist    <3.7 19.1 >15.0 2.7 Wrist 5th Digit 2.7 0.0     Right Ulnar Anti Sensory (5th Digit)   Wrist    <3.7 24.4 >15.0 2.3 Wrist 5th Digit 2.3 0.0       Motor Summary Table     Stim Site NR Onset (ms) Norm Onset (ms) O-P Amp (mV) Norm O-P Amp Site1 Site2 Delta-0 (ms) Dist (cm) Arturo (m/s) Norm Arturo (m/s)   Left Median Motor (Abd Poll Brev)   Wrist    *4.8 <4.2 9.2 >5 Elbow Wrist 4.2 25.0 60 >50   Elbow    9.0  2.0          Right Median Motor (Abd Poll Brev)   Wrist    4.2 <4.2 6.8 >5 Elbow Wrist 4.9 25.0 51 >50   Elbow    9.1  4.5          Left Ulnar Motor (Abd Dig Minimi)   Wrist    2.9 <4.2 9.1 >3 B Elbow Wrist 4.5 23.0 51 >50   B Elbow    7.4  8.7  A Elbow B Elbow 1.7 10.0 59 >50   A Elbow    9.1  9.1          Right Ulnar Motor (Abd Dig Minimi)   Wrist    3.1 <4.2 9.7 >3 B Elbow Wrist 4.3 23.0 53 >50   B Elbow    7.4  4.7  A Elbow B Elbow 1.4 9.0 64 >50   A Elbow    8.8  9.4            F Wave Studies     NR F-Lat (ms) Lat Norm (ms) L-R F-Lat (ms) L-R Lat Norm   Left Median (Mrkrs) (Abd Poll Brev)      *33.13 <33 0.00 <2.2   Right Median (Mrkrs) (Abd Poll Brev)      *33.13 <33 0.00 <2.2   Right Ulnar (Mrkrs) (Abd Dig Min)      30.83 <36  <2.5     EMG Examination   The study was performed with a concentric needle electrode. Fibrillation and fasciculation activity is graded from none (0) to continuous (4+). The configuration and recruitment pattern of motor unit action potentials under voluntary control, if not normal, are described below       Side Muscle Nerve Root Ins Act Fibs Psw Amp Dur Poly Recrt Int Pat Comment   Left Deltoid Axillary C5-6 Nml Nml Nml Nml Nml 0 Nml Nml    Left Triceps Radial C6-7-8 Nml Nml Nml Nml Nml 0 Nml Nml    Left Biceps Musculocut C5-6 Nml Nml Nml Nml Nml 0 Nml Nml    Left PronatorTeres  Median C6-7 Nml Nml Nml Nml Nml 0 Nml Nml    Left FlexCarpiUln Ulnar C8,T1 Nml Nml Nml Nml Nml 0 Nml Nml    Left 1stDorInt Ulnar C8-T1 Nml Nml Nml Nml Nml 0 Nml Nml    Left Abd Poll Brev Median C8-T1 Nml Nml Nml Nml Nml 0 Nml Nml    Right Deltoid Axillary C5-6 Nml Nml Nml Nml Nml 0 Nml Nml    Right Triceps Radial C6-7-8 Nml Nml Nml Nml Nml 0 Nml Nml    Right Biceps Musculocut C5-6 Nml Nml Nml Nml Nml 0 Nml Nml    Right PronatorTeres Median C6-7 Nml Nml Nml Nml Nml 0 Nml Nml    Right 1stDorInt Ulnar C8-T1 Nml Nml Nml Nml Nml 0 Nml Nml        NCV FINDINGS:  Evaluation of the left median motor nerve showed prolonged distal onset latency (4.8 ms).  The left median sensory, the right median sensory, and the left ulnar sensory nerves showed prolonged distal peak latency (L4.8, R4.1, L3.8 ms).  All remaining nerves (as indicated in the following tables) were within normal limits.  F Wave studies indicate that the left median F wave has prolonged latency (33.13 ms).  The right median F wave has prolonged latency (33.13 ms).  All remaining F Wave latencies were within normal limits.        EMG FINDINGS:  All examined muscles (as indicated in the following table) showed no evidence of electrical instability.     Conclusion: This study showed neurophysiologic evidence of several abnormalities:    Moderate left median neuropathy at the wrist, ie carpal tunnel syndrome.  Mild right median sensory neuropathy at the wrist, ie carpal tunnel syndrome.   Mild left ulnar sensory neuropathy at the wrist.           Instrument used:  Teca Synergy        Performed by:          Cabrera Lozada MD

## 2025-02-24 ENCOUNTER — OFFICE VISIT (OUTPATIENT)
Dept: ORTHOPEDIC SURGERY | Facility: CLINIC | Age: 35
End: 2025-02-24
Payer: COMMERCIAL

## 2025-02-24 VITALS
BODY MASS INDEX: 34.95 KG/M2 | DIASTOLIC BLOOD PRESSURE: 84 MMHG | SYSTOLIC BLOOD PRESSURE: 120 MMHG | HEIGHT: 67 IN | WEIGHT: 222.66 LBS

## 2025-02-24 DIAGNOSIS — G56.22 CUBITAL TUNNEL SYNDROME ON LEFT: ICD-10-CM

## 2025-02-24 DIAGNOSIS — G56.02 CARPAL TUNNEL SYNDROME OF LEFT WRIST: Primary | ICD-10-CM

## 2025-02-24 PROCEDURE — 99214 OFFICE O/P EST MOD 30 MIN: CPT | Performed by: STUDENT IN AN ORGANIZED HEALTH CARE EDUCATION/TRAINING PROGRAM

## 2025-02-24 NOTE — PROGRESS NOTES
"                                                                 [facility] Orthopedic     Office Visit       Date: 02/24/2025   Patient Name: Nereida Mejia  MRN: [id number]  YOB: 1990    Referring Physician: No ref. provider found     Chief Complaint:   Chief Complaint   Patient presents with    Left Wrist - Follow-up     After EMG 02/18/2025     History of Present Illness:   Nereida Mejia is a 34 y.o. female right-hand-dominant presented to clinic for follow-up of EMG results.  She continues to endorse numbness and tingling throughout the left hand that is worse at the thumb index and long fingers but is also present intermittently in the small and ring fingers.  She has been wearing her braces at night without significant improvement.  No other complaints or concerns.    Right carpal tunnel release in 2014.    Subjective   Review of Systems:   Review of Systems   Pertinent review of systems per HPI    I reviewed the patient's chief complaint, history of present illness, review of systems, past medical history, surgical history, family history, social history, medications and allergy list in the EMR on 02/24/2025 and agree with the findings above.    Objective    Vital Signs:   Vitals:    02/24/25 1118   BP: 120/84   Weight: 101 kg (222 lb 10.6 oz)   Height: 170.2 cm (67.01\")     General: No acute distress. Alert and oriented.   Cardiovascular: Palpable radial pulse.   Respiratory: Breathing is nonlabored.   Ortho Exam:  Examination of the left Upper Extremity:  No skin lesions or ecchymosis. No edema.    Interosseous wasting is not visualized   Thenar atrophy is not visualized     Hypothenar atrophy is not visualized     positive Tinel's at the carpal tunnel, positive Durkan's compression test, positive Phalen's maneuver   positive Tinel's over Guyon's canal   positive Tinel's at the ulnar nerve at the elbow   positive Elbow Flexion Test   negative Subluxation of the ulnar nerve   negative " tenderness with deep palpation of the pronator   negative Tinel's with median nerve at pronator   negative Froment's sign, positive Wartenberg's sign   5/5 APB strength   5/5 FDI strength   Sensation is decreased to light touch over the median and ulnar nerve distributions.   Digits warm and well-perfused      Imaging / Studies:    Imaging Results (Last 24 Hours)       ** No results found for the last 24 hours. **        EMG nerve conduction study performed on 2/18/2025 demonstrates:  Moderate left median neuropathy at the wrist, ie carpal tunnel syndrome.  Mild right median sensory neuropathy at the wrist, ie carpal tunnel syndrome.   Mild left ulnar sensory neuropathy at the wrist.     Assessment / Plan    Assessment/Plan:   Nereida Mejia is a 34 y.o. 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    The patient her EMG findings demonstrate moderate left carpal tunnel syndrome and left ulnar nerve entrapment at the wrist.  Clinically the patient does have symptoms of ulnar nerve irritation at the Amarilys canal but also at the cubital tunnel.  This was not picked up on her EMG findings but are clinically significant.  I discussed with her options moving forward including nonoperative treatment and operative treatment.  The patient is most interested in definitive surgical treatment.  I discussed with her doing a staged procedure including the carpal tunnel first followed by ulnar nerve decompression at the elbow and wrist if not improving.  Patient expressed understanding but wishes for 1 surgery.  I think this is a reasonable plan.  I explained the patient the risk, benefits, alternatives and prognosis, and she elects to proceed with left carpal tunnel release and left ulnar nerve decompression at the wrist and elbow.    The risks and benefits of the procedure were discussed with the patient and/or appropriate guardian, which include but are not limited  to: the risk of bleeding, pain, infection, wound complications, neurovascular damage, post-operative stiffness, tendon and/or ligament injury, persistent pain, need for additional surgeries in the future, and general risks from anesthesia. Carpal tunnel risks: Specific risks include: persistent numbness and tingling as the goal of surgery is to prevent further worsening of symptoms, damage to the median nerve and its branches, persistent weakness and pillar pain.,  Ulnar nerve decompression specific risks include: persistent numbness and tingling as the goal of surgery is to prevent further worsening of symptoms, numbness to the incision and/or medial elbow, damage to the medial antebranchial nerve branches, persistent weakness and incisional dehiscence post operatively. We also discussed the post-operative rehabilitation, length of immobilization, the need for therapy, and the overall expected outcomes from the procedure. Proper time was given to answer the patient's questions regarding the procedure. The patient expressed understanding. Knowing what the risks are and what the conservative treatment is, the patient elected to forgo any further conservative treatment options and proceed with the surgical intervention. Surgical consent was obtained in the clinic and signed by myself and the patient. They will follow up with me postoperatively.       ICD-10-CM ICD-9-CM   1. Carpal tunnel syndrome of left wrist  G56.02 354.0   2. Cubital tunnel syndrome on left  G56.22 354.2     Follow Up:   Return for Follow Up- After surgery.      Paula Bailey MD  [facility] Orthopedic & Hand Surgeon

## 2025-02-26 ENCOUNTER — TELEPHONE (OUTPATIENT)
Dept: ORTHOPEDIC SURGERY | Facility: CLINIC | Age: 35
End: 2025-02-26

## 2025-02-26 NOTE — TELEPHONE ENCOUNTER
Caller: SENG    Relationship: SELF    Best call back number: [phone]    What form or medical record are you requesting: NEEDS A WORK NOTE JUST TO SAY SHE WAS SEEN AND NO RESTRICTIONS- [facility]     unknown

## 2025-03-25 ENCOUNTER — TELEPHONE (OUTPATIENT)
Age: 35
End: 2025-03-25
Payer: COMMERCIAL

## 2025-03-25 NOTE — TELEPHONE ENCOUNTER
I called to update patient that I did get in touch with the workers comp  today, She has submitted her paperwork to utilization review and told me to call her back Friday and she should have an answer at that time. She did apologize for the delay. If patient has any questions she can give me a call back, otherwise I will call her Friday sometime after I hear from . Vivi Anne CMA

## 2025-03-28 ENCOUNTER — TELEMEDICINE (OUTPATIENT)
Dept: INTERNAL MEDICINE | Facility: CLINIC | Age: 35
End: 2025-03-28
Payer: COMMERCIAL

## 2025-03-28 DIAGNOSIS — F32.0 CURRENT MILD EPISODE OF MAJOR DEPRESSIVE DISORDER WITHOUT PRIOR EPISODE: Primary | ICD-10-CM

## 2025-03-28 DIAGNOSIS — F41.1 GENERALIZED ANXIETY DISORDER: ICD-10-CM

## 2025-03-28 PROCEDURE — 99213 OFFICE O/P EST LOW 20 MIN: CPT

## 2025-03-28 RX ORDER — BUSPIRONE HYDROCHLORIDE 5 MG/1
5 TABLET ORAL 2 TIMES DAILY
Qty: 60 TABLET | Refills: 0 | Status: SHIPPED | OUTPATIENT
Start: 2025-03-28

## 2025-03-28 NOTE — PROGRESS NOTES
Telehealth E-Visit      Date: 2025   Patient Name: Nereida Mejia  : 1990   MRN: [id number]     Chief Complaint:    Chief Complaint   Patient presents with    Stress    Irritable       I have reviewed the E-Visit questionnaire and the patient's answers, my assessment and plan are listed below.     This provider is located at the [facility] (through [facility]), [address] [place] [address] using a secure [employer] Soft Video Visit through [employer]. Patient is being seen remotely via telehealth at their home address in [place], and stated they are in a secure environment for this session. The patient's condition being diagnosed/treated is appropriate for telemedicine. The provider identified herself as well as her credentials. The patient, and/or patients guardian, consent to be seen remotely, and when consent is given they understand that the consent allows for patient identifiable information to be sent to a third party as needed. They may refuse to be seen remotely at any time. The electronic data is encrypted and password protected, and the patient and/or guardian has been advised of the potential risks to privacy not withstanding such measures.    You have chosen to receive care through a telehealth visit. Do you consent to use a video/audio connection for your medical care today? Yes    History of Present Illness: Nereida Mejia is a 34 y.o. female who is here today to follow up with anxiety and depression- emotional, irritable, stressed.  is on third shift, working most weekends. Feeling overwhelmed with . No close family for support. Feeling exhausted. Daughter is a senior but feels like she is not as excited as she should be. Also waiting to hear back on workman's comp for wrist surgery which is contributing to stress. No SI/HI.       Subjective      Review of Systems:   Review of Systems   Psychiatric/Behavioral:  Positive for sleep  disturbance, depressed mood and stress. Negative for suicidal ideas. The patient is nervous/anxious.        I have reviewed and the following portions of the patient's history were updated as appropriate: past family history, past medical history, past social history, past surgical history and problem list.    Medications:     Current Outpatient Medications:     dicyclomine (BENTYL) 10 MG capsule, Take one by mouth 3 times daily 30 minutes before meals, Disp: 90 capsule, Rfl: 1    ferrous gluconate (FERGON) 324 MG tablet, Take 1 tablet by mouth Daily With Breakfast., Disp: 90 tablet, Rfl: 0    Allergies:   No Known Allergies    Objective     Physical Exam:  Vital Signs: There were no vitals filed for this visit.  There is no height or weight on file to calculate BMI.    Physical Exam  Constitutional:       Comments: Limited due to telehealth           Assessment / Plan      Assessment/Plan:      Current mild episode of major depressive disorder without prior episode  - Due to side effect profile we decided to trial fluoxetine. Patient to follow up with Dr. Burgos in 1-month for evaluation  - PRN buspar  - Declined behavioral health referral at this time    Orders:    FLUoxetine (PROzac) 20 MG capsule; Take 1 capsule by mouth Daily.    Generalized anxiety disorder  - See above  Orders:    busPIRone (BUSPAR) 5 MG tablet; Take 1 tablet by mouth 2 (Two) Times a Day.        Follow Up:   Return in about 1 month (around 4/28/2025) for Recheck mood with Dr. Burgos.    Any medications prescribed have been sent electronically to   [facility] [id number] - [place], [place] - [address] [phone] PH - [phone] FX  [address] [address]  Phone: [phone] Fax: [phone]    [facility]/pharmacy #[id number] - [place], [place] - [address] [facility] [place] - [phone] PH - [phone] FX  [address] [address]  Phone: [phone] Fax: [phone]      24 minutes were spent  reviewing the patient's questionnaire, formulating a treatment plan, and relaying information to the patient via [employer].    TYLER De La Cruz  Internal Medicine [place]   03/28/25  14:14 EDT

## 2025-03-28 NOTE — ASSESSMENT & PLAN NOTE
- See above  Orders:    busPIRone (BUSPAR) 5 MG tablet; Take 1 tablet by mouth 2 (Two) Times a Day.

## 2025-04-07 ENCOUNTER — TELEPHONE (OUTPATIENT)
Dept: ORTHOPEDIC SURGERY | Facility: CLINIC | Age: 35
End: 2025-04-07
Payer: COMMERCIAL

## 2025-04-07 NOTE — TELEPHONE ENCOUNTER
Caller: AURELIA   Relationship to Patient: SELF  Phone Number: [phone]  What form or medical record are you requesting: WORK NOTE STATING THAT SHE WILL BE OFF AFTER SX UNTIL 1ST POST OP APPT     STATES SHE NEEDS THIS ASAP   How would you like to receive the form or medical records (pick-up, mail, fax): MY CHART

## 2025-04-09 ENCOUNTER — DOCUMENTATION (OUTPATIENT)
Age: 35
End: 2025-04-09
Payer: COMMERCIAL

## 2025-04-09 ENCOUNTER — OUTSIDE FACILITY SERVICE (OUTPATIENT)
Age: 35
End: 2025-04-09
Payer: COMMERCIAL

## 2025-04-09 DIAGNOSIS — G56.02 CARPAL TUNNEL SYNDROME OF LEFT WRIST: Primary | ICD-10-CM

## 2025-04-09 DIAGNOSIS — G56.22 CUBITAL TUNNEL SYNDROME ON LEFT: ICD-10-CM

## 2025-04-09 PROCEDURE — 64718 REVISE ULNAR NERVE AT ELBOW: CPT | Performed by: STUDENT IN AN ORGANIZED HEALTH CARE EDUCATION/TRAINING PROGRAM

## 2025-04-09 PROCEDURE — 64721 CARPAL TUNNEL SURGERY: CPT | Performed by: STUDENT IN AN ORGANIZED HEALTH CARE EDUCATION/TRAINING PROGRAM

## 2025-04-09 PROCEDURE — 64719 REVISE ULNAR NERVE AT WRIST: CPT | Performed by: STUDENT IN AN ORGANIZED HEALTH CARE EDUCATION/TRAINING PROGRAM

## 2025-04-09 RX ORDER — OXYCODONE HYDROCHLORIDE 5 MG/1
5 TABLET ORAL EVERY 8 HOURS PRN
Qty: 15 TABLET | Refills: 0 | Status: SHIPPED | OUTPATIENT
Start: 2025-04-09 | End: 2025-04-11 | Stop reason: SINTOL

## 2025-04-09 NOTE — PROGRESS NOTES
ORTHOPEDIC OPERATIVE REPORT    PATIENT NAME: Nereida Mejia     MRN: [id number]     YOB: 1990    DATE OF SURGERY: 04/09/25    LOCATION: [facility]    PREOPERATIVE DIAGNOSIS:   Left ulnar neuropathy at the wrist and elbow  Left carpal tunnel syndrome    POSTOPERATIVE DIAGNOSIS:  Left ulnar neuropathy at the wrist and elbow  Left carpal tunnel syndrome    PROCEDURE PERFORMED:  Left ulnar release at the elbow  Left ulnar release at the wrist  Left carpal tunnel release    CPT CODE:  66460  [id number]  [id number]    SURGEON: Paula Bailey MD     ASSISTANT: None     ANESTHESIA: Monitored anesthesia with local 1% lidocaine and 0.5% Marcaine plain    SPECIMENS: None    TOURNIQUET TIME: 28 minutes    ESTIMATED BLOOD LOSS: Minimal    COMPLICATIONS: None    IMPLANTS: None    INDICATIONS FOR PROCEDURE:  Nereida Mejia is a 58-year-old right-hand-dominant female who presented to clinic with complaints of numbness and tingling to the left hand involving all digits of the hand.  Clinical examination was consistent with ulnar neuropathy and carpal tunnel syndrome.  Electrodiagnostic studies were obtained. These demonstrated moderate left carpal tunnel syndrome, mild right carpal tunnel syndrome, and mild left ulnar sensory neuropathy at the wrist.  Patient's clinical examination demonstrated positive Tinel's over the carpal tunnel, Guyon's canal, and cubital tunnel.  He there is a positive elbow flexion compression test.  The patient was offered conservative treatment including activity modification, nerve gliding exercises,and nighttime splinting. The patient failed to improve with these measures and were offered ulnar nerve decompression at the wrist and elbow with concomitant carpal tunnel release.  I discussed with the patient that the need for transposition of the ulnar nerve is determined intraoperatively based on stability throughout range of motion. Additionally I discussed that the goal of surgery is to prevent further  worsening of symptoms and nerve compression and that I cannot guarantee complete resolution of symptoms and/or improvements in weakness.  We also discussed that despite having negative EMG findings for left cubital tunnel syndrome, clinically she is symptomatic and we can consider release of the ulnar nerve from concomitantly.  She expressed understanding and wished for a 1 procedure to address all issues.  I explained to them the risk, benefits, alternatives and prognosis, and elected to proceed with left ulnar nerve decompression at the wrist and elbow and left carpal tunnel release.    DESCRIPTION OF PROCEDURE:   The patient was identified in the preoperative holding area utilizing two patient identifiers.  The operative extremity was marked.  They were taken back to the operating room and placed supine on the operative table.  A upper arm tourniquet was placed and the operative extremity was prepped and draped in a standard sterile fashion. Monitored anesthesia was induced smoothly by the anesthesia team. A surgical time out was performed that confirmed the correct site, procedure and laterality. No preoperative antibiotics were given.     We began at the elbow, where a berenice was made around the olecranon and the medial epicondyle. An incision was drawn centered between the medial epicondyle and olecranon measuring approximately 4 cm in length. 10cc of local anesthetic consisting of a 50:50 mixture of 0.5% Marcaine mixed with 1% lidocaine plain was injected at the site of the planned incision and along the course of the nerve proximally and distally. Similarly, the planned incision at Guyon's canal was injected with local 50:50 mixture of local overlying the planned surgical incision. An esmarch was used to exsanguinate the limb, and the tourniquet was inflated to 250 mmHg.     Attention was turned to the medial aspect of the elbow and the skin was incised with a 15 blade scalpel.  Subcutaneous tissues were  bluntly spread with scissors.  Hemostasis was achieved at the wound edges with bipolar electrocautery.  A self-retaining retractor was placed and dissection was continued down to the level of the medial epicondyle.  Once the medial epicondyle was palpated, Peña's ligament was identified and the ulnar nerve just proximal to this point was noted. There was no evidence of an anconeus epitrochlearis muscle.  A self-retaining retractor was repositioned and an army-navy was placed proximally to improve visualization.  The dissection was continued proximally along the course of the ulnar nerve releasing fascial bands approximately 8 cm proximal to the medial epicondyle.  This was confirmed both under direct visualization and palpation.    I then turned my attention to releasing Peña ligament which was done under direct visualization utilizing a 15 blade scalpel.  There was noted to be compression of the ulnar nerve. Once complete, dissection continued distally and the self-retaining retractor was repositioned distally with an army-navy.  The two heads of the FCU were identified with the ulnar nerve coursing between them.  The superficial and deep fascial bands were released approximately 8 cm distal to the medial epicondyle.  There was no significant compression noted at the ulnar nerve as it coursed between the two heads of the FCU.  Decompression of the nerve distally was confirmed with direct visualization and palpation. The elbow was brought through a range of motion, including flexion and extension, demonstrating stability without evidence of ulnar nerve subluxation. Therefore a transposition was  not indicated.     I then turned my attention to release of the Guyon's canal.  A 4 cm longitudinal incision was marked out overlying Guyon's canal in the palm just ulnar to the thenar crease with a Arlen incision marked crossing the wrist crease.  The skin was sharply incised with a 15 blade scalpel.  The  subcutanoues tissues were bluntly dissected.  Starting proximally, the ulnar neurovascular bundle was identified just deep to the FCU tendon and dissection through the thickened fascial tissue at the wrist crease was released.  Dissection continued distally and the palmaris brevis was released further exposing the neurovascular bundle.  The small crossing branches of the ulnar artery were ligated allowing retraction of the ulnar artery medially. The sensory branches of the ulnar nerve were identified.  The hook of the hamate was palpated and marked with a marking pen.  The hypothenar fascia was identified and with careful dissection, this was released with scissors from the hook of the hamate.  With continued dissection, the deep motor branch of the ulnar nerve was identified coursing around the hook of the hamate and there were no additional sites of compression. No space occupying lesions were identified.      I then turned my attention to the carpal tunnel release.  With the neurovascular bundle retracted medially, an incision was marked along the ulnar border of the transverse carpal ligament adjacent to the hook of the hamate.  This was sharply incised with a 15 blade scalpel.  This extended down to the distal aspect of the transverse carpal ligament.  This was sharply excised and the underlying flexor tendons to the ring and small finger were identified after complete release.  A Booker was then placed superficial and deep to the antebrachial fascia proximally and this was released under direct visualization.  There was no evidence of a transligamentous motor branch or additional pathology within the carpal tunnel.    The tourniquet was deflated and hemostasis was achieved with bipolar electrocautery at the elbow and wrist.  The wounds were thoroughly irrigated with normal saline. The subcutaneous tissues and the skin were closed with a 4-0 Monocryl in a interrupted followed by a running subcuticular fashion at  the elbow. The wrist incision was closed with 4-0 nylon in a horizontal suture pattern.  No Steri-Strips were applied due to an allergy.  Skin glue was applied along the elbow followed by 4 x 4's, web roll and an Ace wrap. 4x4's and an ace wrap were placed at the distal incision. The patient was awoken from anesthesia and transferred to the PACU in good and stable condition.  All counts were correct at the end of the case.    POSTOPERATIVE PLAN:  No lifting greater than 5 pounds with the operative extremity.  2.  Over the counter Tylenol and/or Advil/Aleve/Motrin for pain control. A narcotic prescription for Oxycodone 5mg was also provided as needed for breakthrough pain.  3.  Dressings may be removed in 5 days and replaced with a dry daily dressing. Steri strips are to remain in place.   4.  Follow up in 10-14 days as scheduled.    Paula Bailey MD  [facility] [place] [facility]

## 2025-04-11 DIAGNOSIS — G56.22 CUBITAL TUNNEL SYNDROME ON LEFT: ICD-10-CM

## 2025-04-11 DIAGNOSIS — G56.02 CARPAL TUNNEL SYNDROME OF LEFT WRIST: Primary | ICD-10-CM

## 2025-04-11 RX ORDER — HYDROCODONE BITARTRATE AND ACETAMINOPHEN 5; 325 MG/1; MG/1
1 TABLET ORAL EVERY 6 HOURS PRN
Qty: 15 TABLET | Refills: 0 | Status: SHIPPED | OUTPATIENT
Start: 2025-04-11

## 2025-04-21 DIAGNOSIS — F41.1 GENERALIZED ANXIETY DISORDER: ICD-10-CM

## 2025-04-23 ENCOUNTER — TELEPHONE (OUTPATIENT)
Dept: ORTHOPEDIC SURGERY | Facility: CLINIC | Age: 35
End: 2025-04-23
Payer: COMMERCIAL

## 2025-04-23 NOTE — TELEPHONE ENCOUNTER
LVM that I sent her message to the PA and she recommended she bring it up at her appointment tomorrow so it can be discussed then. Call back with any questions or concerns.    Sharon Gibson CMA ([facility])

## 2025-04-23 NOTE — TELEPHONE ENCOUNTER
Patient would like to know if it's possible to do physical therapy. Her surgery was on 4/9/2025 and she is still feeling pain. She's also having trouble gripping or moving her hand a certain way. She would need to give her job a 2 day notice just in case she needs to be off work.     Please advise.

## 2025-04-24 ENCOUNTER — OFFICE VISIT (OUTPATIENT)
Dept: ORTHOPEDIC SURGERY | Facility: CLINIC | Age: 35
End: 2025-04-24
Payer: COMMERCIAL

## 2025-04-24 VITALS — TEMPERATURE: 97.3 F

## 2025-04-24 DIAGNOSIS — F41.1 GENERALIZED ANXIETY DISORDER: ICD-10-CM

## 2025-04-24 DIAGNOSIS — G56.02 CARPAL TUNNEL SYNDROME OF LEFT WRIST: Primary | ICD-10-CM

## 2025-04-24 DIAGNOSIS — F32.0 CURRENT MILD EPISODE OF MAJOR DEPRESSIVE DISORDER WITHOUT PRIOR EPISODE: ICD-10-CM

## 2025-04-24 DIAGNOSIS — G56.22 ULNAR NEUROPATHY OF LEFT UPPER EXTREMITY: ICD-10-CM

## 2025-04-24 PROCEDURE — 99024 POSTOP FOLLOW-UP VISIT: CPT | Performed by: STUDENT IN AN ORGANIZED HEALTH CARE EDUCATION/TRAINING PROGRAM

## 2025-04-24 RX ORDER — BUSPIRONE HYDROCHLORIDE 5 MG/1
5 TABLET ORAL 2 TIMES DAILY
Qty: 60 TABLET | Refills: 0 | OUTPATIENT
Start: 2025-04-24

## 2025-04-24 NOTE — PROGRESS NOTES
[facility] Orthopedic     Post Operative Office Visit      Date: 04/24/2025   Patient Name: Nereida Mejia  MRN: [id number]  YOB: 1990    Chief Complaint:   Chief Complaint   Patient presents with   • Post-op     2 weeks S/P Left carpal tunnel release (DOS 04/09/2025)     History of Present Illness:   Nereida Mejia is a 35 y.o. female status post left carpal tunnel release, cubital tunnel release, Guyon's canal release, DOS 4/21/25.  Reports that she has done well since surgery.  She had increased pain in the first several days after surgery but since then has been improving.  She states the numbness and tingling to her thumb index and long fingers has completely resolved.  She does have residual numbness and tingling to the small and ring fingers although she does feel that this is somewhat better.  No wound complications.  No other complaints.    Subjective   Review of Systems:   Review of Systems   Constitutional:  Negative for chills, fever, unexpected weight gain and unexpected weight loss.   HENT:  Negative for congestion, postnasal drip and rhinorrhea.    Eyes:  Negative for blurred vision.   Respiratory:  Negative for shortness of breath.    Cardiovascular:  Negative for leg swelling.   Gastrointestinal:  Negative for abdominal pain, nausea and vomiting.   Genitourinary:  Negative for difficulty urinating.   Musculoskeletal:  Positive for arthralgias. Negative for gait problem, joint swelling and myalgias.   Skin:  Negative for skin lesions and wound.   Neurological:  Negative for dizziness, weakness, light-headedness and numbness.   Hematological:  Does not bruise/bleed easily.   Psychiatric/Behavioral:  Negative for depressed mood.         I reviewed the patient's chief complaint, history of present illness, review of systems, past medical history, surgical history, family history, social history, medications and allergy  list in the EMR on 04/24/2025 and agree with the findings above.    Objective    Vital Signs:   Vitals:    04/24/25 1136   Temp: 97.3 °F (36.3 °C)       General Appearance: No acute distress. Alert and oriented.     Ortho Exam:  Examination of the left Upper Extremity:  • Skin examination: Healing surgical incisions overlying the cubital tunnel and Guyon's canal.  Sutures are in place distally.  These were removed and tolerated well.  No erythema warmth drainage or dehiscence.  She is mildly tender along both surgical incisions.  •Able to make a composite fist  •AIN/PIN/Radial/Ulnar nerves grossly motor intact  •Median/radial/ulnar sensory intact to light touch  • Palpable radial pulse, digits are warm and well-perfused  •     Imaging / Studies:    Imaging Results (Last 24 Hours)       ** No results found for the last 24 hours. **          Assessment / Plan    Assessment/Plan:   Nereida Mejia is a 35 y.o. female status post left carpal tunnel release, cubital tunnel release, Guyon's canal release, DOS 4/21/25.     Patient has done well since her surgery.  Sutures removed today in clinic and I counseled her on gentle scar massage.  Will begin a course of formal hand therapy at [facility].  A prescription was provided.  She will continue off work for the next 4 weeks.  A note was provided.  I will see her back in 4 weeks for reevaluation and I anticipate return to work at that time.  All questions and concerns were addressed.  She is agreeable.      ICD-10-CM ICD-9-CM   1. Carpal tunnel syndrome of left wrist  G56.02 354.0   2. Ulnar neuropathy of left upper extremity  G56.22 354.2     Follow Up:   Return in about 4 weeks (around 5/22/2025).      Paula Bailey MD  [facility] [place] [facility]

## 2025-04-28 ENCOUNTER — TELEPHONE (OUTPATIENT)
Dept: ORTHOPEDIC SURGERY | Facility: CLINIC | Age: 35
End: 2025-04-28

## 2025-04-28 NOTE — TELEPHONE ENCOUNTER
Caller: [facility] [employer] [facility] [employer]      Best call back number: [phone]     PLEASE FAX 04-24-25 OFC NOTE & OCCUPATIONAL THERAPY ORDER [id number] TO [employer]  [facility]URA FLORES AT FAX # [phone]     CLAIM # DOES NOT NEED TO BE NOTED     THANKS

## 2025-05-22 ENCOUNTER — OFFICE VISIT (OUTPATIENT)
Dept: ORTHOPEDIC SURGERY | Facility: CLINIC | Age: 35
End: 2025-05-22
Payer: COMMERCIAL

## 2025-05-22 DIAGNOSIS — G56.02 CARPAL TUNNEL SYNDROME OF LEFT WRIST: Primary | ICD-10-CM

## 2025-05-22 PROCEDURE — 99024 POSTOP FOLLOW-UP VISIT: CPT | Performed by: STUDENT IN AN ORGANIZED HEALTH CARE EDUCATION/TRAINING PROGRAM

## 2025-05-22 NOTE — PROGRESS NOTES
[facility] Orthopedic     Post Operative Office Visit      Date: 05/22/2025   Patient Name: Nereida Mejia  MRN: [id number]  YOB: 1990    Chief Complaint:   Chief Complaint   Patient presents with    Post-op     4 week follow up; 6 weeks S/P Left carpal tunnel release (DOS 04/09/2025)     History of Present Illness:   Nereida Mejia is a 35 y.o. female status post left carpal tunnel release, cubital tunnel release, Guyon's canal release, DOS 4/21/25.  Patient reports that since her last clinic visit she is continue with therapy.  She overall feels the numbness and tingling has significantly improved from her preoperative symptoms.  She states that she feels most limited due to to a dorsal ganglion cyst that has developed to the operative wrist.  This makes therapy more difficult.  She also endorses hypersensitivity to the medial elbow incision.  No other complaints or concerns.    Subjective   Review of Systems:   Review of Systems   Constitutional: Negative.    HENT: Negative.     Eyes: Negative.    Respiratory: Negative.     Cardiovascular: Negative.    Gastrointestinal: Negative.    Endocrine: Negative.    Genitourinary: Negative.    Musculoskeletal:  Positive for arthralgias.   Skin: Negative.    Allergic/Immunologic: Negative.    Neurological: Negative.    Hematological: Negative.    Psychiatric/Behavioral: Negative.          I reviewed the patient's chief complaint, history of present illness, review of systems, past medical history, surgical history, family history, social history, medications and allergy list in the EMR on 05/22/2025 and agree with the findings above.    Objective    Vital Signs: There were no vitals filed for this visit.    General Appearance: No acute distress. Alert and oriented.     Ortho Exam:  Examination of the left Upper Extremity:   Skin examination: Well-healed surgical incision overlying the volar  aspect of the wrist and medial elbow.  There is hypersensitivity noted to the medial elbow incision with minimal tenderness or hypersensitivity to the wrist incision.   Able to make a composite fist, full elbow range of motion  AIN/PIN/Radial/Ulnar nerves grossly motor intact  Median/radial/ulnar sensory intact to light touch   Palpable radial pulse, digits are warm and well-perfused   there is a 2.5 x 2 cm palpable dorsal radial ganglion cyst.  This is mildly tender to palpation.       Imaging / Studies:    Imaging Results (Last 24 Hours)       ** No results found for the last 24 hours. **          Assessment / Plan    Assessment/Plan:   Nereida Mejia is a 35 y.o. female status post left carpal tunnel release, cubital tunnel release, Guyon's canal release, DOS 4/21/25.     The patient continues to have slow progress postoperatively.  Her wrist incision has become less tender and sensitive with time, however she now is complaining of pain due to a dorsal ganglion cyst.  I would like her to perform wrist splinting at all times for the next 6 weeks.  She may remove for hygiene and for therapy directed exercises.  Additionally, she has sensitivity to the medial elbow incision and I would like her to work on desensitization techniques with the therapist.  Will continue her off work for the next 4 weeks, as the patient does not feel comfortable returning to work at this time with her current job responsibilities.  I will see her back in 4 weeks for reevaluation.  We will plan on lifting work restrictions at that time.  All questions and concerns were addressed.  She is agreeable.      ICD-10-CM ICD-9-CM   1. Carpal tunnel syndrome of left wrist  G56.02 354.0       Follow Up:   Return in about 4 weeks (around 6/19/2025) for Follow Up.      Paula Bailey MD  [facility]

## 2025-06-19 ENCOUNTER — OFFICE VISIT (OUTPATIENT)
Dept: ORTHOPEDIC SURGERY | Facility: CLINIC | Age: 35
End: 2025-06-19
Payer: COMMERCIAL

## 2025-06-19 DIAGNOSIS — G56.02 CARPAL TUNNEL SYNDROME OF LEFT WRIST: Primary | ICD-10-CM

## 2025-06-19 PROCEDURE — 99024 POSTOP FOLLOW-UP VISIT: CPT | Performed by: STUDENT IN AN ORGANIZED HEALTH CARE EDUCATION/TRAINING PROGRAM

## 2025-06-19 NOTE — PROGRESS NOTES
[facility] Orthopedic     Post Operative Office Visit      Date: 06/19/2025   Patient Name: Nereida Mejia  MRN: [id number]  YOB: 1990    Chief Complaint:   Chief Complaint   Patient presents with    Follow-up     4 week follow up -10 weeks  S/P Left carpal tunnel release (DOS 04/09/2025     History of Present Illness:   Nereida Mejia is a 35 y.o. female status post left carpal tunnel release, cubital tunnel release, Guyon's canal release, DOS 4/21/25.  Reports that she has done well since her last clinic visit.  She feels that she has been able to return to most of her activities without discomfort or difficulty.  Her numbness and tingling has completely resolved and the surgical incisions are no longer hypersensitive or bothersome.  She continues to complain of the dorsal radial ganglion cyst to her wrist.  Largely unchanged despite brace wear.  No other complaints or concerns.    Subjective   Review of Systems:   Review of Systems   Constitutional:  Negative for chills, fever, unexpected weight gain and unexpected weight loss.   HENT:  Negative for congestion, postnasal drip and rhinorrhea.    Eyes:  Negative for blurred vision.   Respiratory:  Negative for shortness of breath.    Cardiovascular:  Negative for leg swelling.   Gastrointestinal:  Negative for abdominal pain, nausea and vomiting.   Genitourinary:  Negative for difficulty urinating.   Musculoskeletal:  Positive for arthralgias. Negative for gait problem, joint swelling and myalgias.   Skin:  Negative for skin lesions and wound.   Neurological:  Negative for dizziness, weakness, light-headedness and numbness.   Hematological:  Does not bruise/bleed easily.   Psychiatric/Behavioral:  Negative for depressed mood.         I reviewed the patient's chief complaint, history of present illness, review of systems, past medical history, surgical history, family history, social  history, medications and allergy list in the EMR on 06/19/2025 and agree with the findings above.    Objective    Vital Signs: There were no vitals filed for this visit.    General Appearance: No acute distress. Alert and oriented.     Ortho Exam:  Examination of the left Upper Extremity:   Skin examination: Well-healed surgical incision overlying the volar aspect of the wrist and medial elbow.  There is no hypersensitivity to the wrist or elbow incision.  Able to make a composite fist, full elbow range of motion  AIN/PIN/Radial/Ulnar nerves grossly motor intact  Median/radial/ulnar sensory intact to light touch   Palpable radial pulse, digits are warm and well-perfused   there is a 2.5 x 2 cm palpable dorsal radial ganglion cyst.  This is mildly tender to palpation.       Imaging / Studies:    Imaging Results (Last 24 Hours)       ** No results found for the last 24 hours. **          Assessment / Plan    Assessment/Plan:   Nereida Mejia is a 35 y.o. female status post left carpal tunnel release, cubital tunnel release, Guyon's canal release, DOS 4/21/25.     The patient has done well since surgery.  She has had resolution of her numbness, tingling, and sensitivity to the surgical incisions.  She feels that her motion and strength have returned to preoperative levels.  She is overall satisfied and happy with her outcome.  She feels that she can return to work and a work note was provided for return to work without restrictions on 6/23/2025.  It is in my medical opinion that the patient has reached maximal medical improvement.  No additional follow-up is required.  She will follow-up with me as needed.  Additionally, if she would like to be evaluated for her right wrist ganglion cyst, she can make an appointment to discuss further options.  All questions and concerns were addressed.  She agreeable.      ICD-10-CM ICD-9-CM   1. Carpal tunnel syndrome of left wrist  G56.02 354.0     Follow Up:   Return if symptoms  worsen or fail to improve.      Paula Bailey MD  [facility] [place] [facility]